# Patient Record
Sex: FEMALE | Race: BLACK OR AFRICAN AMERICAN | NOT HISPANIC OR LATINO | Employment: OTHER | URBAN - METROPOLITAN AREA
[De-identification: names, ages, dates, MRNs, and addresses within clinical notes are randomized per-mention and may not be internally consistent; named-entity substitution may affect disease eponyms.]

---

## 2017-01-09 ENCOUNTER — HOSPITAL ENCOUNTER (EMERGENCY)
Facility: OTHER | Age: 62
Discharge: HOME | End: 2017-01-09
Attending: EMERGENCY MEDICINE
Payer: COMMERCIAL

## 2017-01-09 VITALS
HEART RATE: 80 BPM | DIASTOLIC BLOOD PRESSURE: 70 MMHG | SYSTOLIC BLOOD PRESSURE: 137 MMHG | TEMPERATURE: 98 F | BODY MASS INDEX: 26.99 KG/M2 | OXYGEN SATURATION: 99 % | HEIGHT: 65 IN | RESPIRATION RATE: 16 BRPM | WEIGHT: 162 LBS

## 2017-01-09 DIAGNOSIS — R51.9 ACUTE NONINTRACTABLE HEADACHE, UNSPECIFIED HEADACHE TYPE: Primary | ICD-10-CM

## 2017-01-09 PROCEDURE — 96361 HYDRATE IV INFUSION ADD-ON: CPT

## 2017-01-09 PROCEDURE — 96374 THER/PROPH/DIAG INJ IV PUSH: CPT

## 2017-01-09 PROCEDURE — 96375 TX/PRO/DX INJ NEW DRUG ADDON: CPT

## 2017-01-09 PROCEDURE — 25000003 PHARM REV CODE 250: Performed by: PHYSICIAN ASSISTANT

## 2017-01-09 PROCEDURE — 63600175 PHARM REV CODE 636 W HCPCS: Performed by: PHYSICIAN ASSISTANT

## 2017-01-09 PROCEDURE — 99284 EMERGENCY DEPT VISIT MOD MDM: CPT | Mod: 25

## 2017-01-09 RX ORDER — ASPIRIN 81 MG/1
81 TABLET ORAL DAILY
COMMUNITY

## 2017-01-09 RX ORDER — KETOROLAC TROMETHAMINE 30 MG/ML
15 INJECTION, SOLUTION INTRAMUSCULAR; INTRAVENOUS
Status: COMPLETED | OUTPATIENT
Start: 2017-01-09 | End: 2017-01-09

## 2017-01-09 RX ORDER — METOCLOPRAMIDE HYDROCHLORIDE 5 MG/ML
10 INJECTION INTRAMUSCULAR; INTRAVENOUS
Status: COMPLETED | OUTPATIENT
Start: 2017-01-09 | End: 2017-01-09

## 2017-01-09 RX ORDER — DIPHENHYDRAMINE HYDROCHLORIDE 50 MG/ML
12.5 INJECTION INTRAMUSCULAR; INTRAVENOUS
Status: COMPLETED | OUTPATIENT
Start: 2017-01-09 | End: 2017-01-09

## 2017-01-09 RX ADMIN — SODIUM CHLORIDE 1000 ML: 0.9 INJECTION, SOLUTION INTRAVENOUS at 08:01

## 2017-01-09 RX ADMIN — DIPHENHYDRAMINE HYDROCHLORIDE 12.5 MG: 50 INJECTION, SOLUTION INTRAMUSCULAR; INTRAVENOUS at 08:01

## 2017-01-09 RX ADMIN — KETOROLAC TROMETHAMINE 15 MG: 30 INJECTION, SOLUTION INTRAMUSCULAR at 08:01

## 2017-01-09 RX ADMIN — METOCLOPRAMIDE 10 MG: 5 INJECTION, SOLUTION INTRAMUSCULAR; INTRAVENOUS at 08:01

## 2017-01-09 NOTE — ED AVS SNAPSHOT
[facility]  [address] [facility] [address]               Geneva Wright   2017  7:52 PM   ED    Description:  Female : 1955   Department:  [facility]           Your Care was Coordinated By:     Provider Role From To    Tiffanie Dunn MD Attending Provider 17 --    Heide Holley PA-C Physician Assistant 17 --      Reason for Visit     Headache           Diagnoses this Visit        Comments    Acute nonintractable headache, unspecified headache type    -  Primary       ED Disposition     None           To Do List           Follow-up Information     Follow up with Haley Washington MD In 2 days.    Specialty:  Internal Medicine    Contact information:    [address] [address]  [phone]          Follow up with [facility].    Specialty:  Emergency Medicine    Why:  If symptoms worsen    Contact information:    [address] [facility] [address] [phone]      [facility]     [facility] - 24/7 Assistance  Registered nurses in the [facility] provide clinical advisement, health education, appointment booking, and other advisory services.  Call for this free service at [phone].             Medications           Message regarding Medications     Verify the changes and/or additions to your medication regime listed below are the same as discussed with your clinician today.  If any of these changes or additions are incorrect, please notify your healthcare provider.        These medications were administered today        Dose Freq    sodium chloride 0.9% bolus 1,000 mL 1,000 mL ED 1 Time    Sig: Inject 1,000 mLs into the vein ED 1 Time.    Class: Normal    Route: Intravenous    metoclopramide HCl injection 10 mg 10 mg ED 1 Time    Sig: Inject 2 mLs (10 mg total) into the vein ED 1 Time.    Class: Normal    Route:  "Intravenous    diphenhydrAMINE injection 12.5 mg 12.5 mg ED 1 Time    Sig: Inject 0.25 mLs (12.5 mg total) into the vein ED 1 Time.    Class: Normal    Route: Intravenous    ketorolac injection 15 mg 15 mg ED 1 Time    Sig: Inject 15 mg into the vein ED 1 Time.    Class: Normal    Route: Intravenous      STOP taking these medications     atorvastatin (LIPITOR) 20 MG tablet Take 20 mg by mouth once daily.    doxycycline (VIBRA-TABS) 100 MG tablet Take 100 mg by mouth 2 (two) times daily.    pantoprazole (PROTONIX) 40 MG tablet Take 40 mg by mouth once daily.    tramadol (ULTRAM) 50 mg tablet Take 50 mg by mouth every 6 (six) hours as needed for Pain.           Verify that the below list of medications is an accurate representation of the medications you are currently taking.  If none reported, the list may be blank. If incorrect, please contact your healthcare provider. Carry this list with you in case of emergency.           Current Medications     amlodipine (NORVASC) 5 MG tablet Take 5 mg by mouth once daily.    aspirin (ECOTRIN) 81 MG EC tablet Take 81 mg by mouth once daily.    glimepiride (AMARYL) 1 MG tablet Take 1 mg by mouth before breakfast.    losartan (COZAAR) 100 MG tablet Take 100 mg by mouth once daily.    metformin (GLUCOPHAGE) 1000 MG tablet Take 1,000 mg by mouth 2 (two) times daily with meals.           Clinical Reference Information           Your Vitals Were     BP Pulse Temp Resp Height Weight    191/99 (BP Location: Left arm, Patient Position: Sitting) 99 97.9 °F (36.6 °C) (Oral) 16 5' 4.5" (1.638 m) 73.5 kg (162 lb)    SpO2 BMI             99% 27.38 kg/m2         Allergies as of 1/9/2017     No Known Allergies      Immunizations Administered on Date of Encounter - 1/9/2017     None      ED Micro, Lab, POCT     None      ED Imaging Orders     Start Ordered       Status Ordering Provider    01/09/17 2124 01/09/17 2124  CT Head Without Contrast  1 time imaging      Final result       Discharge " References/Attachments     HEADACHE, UNSPECIFIED (ENGLISH)    HEADACHES, SELF-CARE FOR (ENGLISH)      [facility] Sign-Up     Activating your [facility] account is as easy as 1-2-3!     1) Visit [email or web address], select Sign Up Now, enter this activation code and your date of birth, then select Next.  Activation code not generated  Current Patient Portal Status: Account disabled      2) Create a username and password to use when you visit [facility] in the future and select a security question in case you lose your password and select Next.    3) Enter your e-mail address and click Sign Up!    Additional Information  If you have questions, please e-mail [email or web address] or call [phone] to talk to our [email or web address] staff. Remember, [facility] is NOT to be used for urgent needs. For medical emergencies, dial 911.          [facility] complies with applicable Federal civil rights laws and does not discriminate on the basis of race, color, national origin, age, disability, or sex.        Language Assistance Services     ATTENTION: Language assistance services are available, free of charge. Please call [phone].      ATENCIÓN: Si habla español, tiene a hyatt disposición servicios gratuitos de asistencia lingüística. Llame al [phone].     CHÚ Ý: N?u b?n nói Ti?ng Vi?t, có các d?ch v? h? tr? ngôn ng? mi?n phí dành cho b?n. G?i s? [phone].

## 2017-01-10 NOTE — ED PROVIDER NOTES
Encounter Date: 1/9/2017       History     Chief Complaint   Patient presents with    Headache     with nausea for about 3 hours     Review of patient's allergies indicates:  No Known Allergies  HPI Comments: Patient is a 61 y.o. female with a past medical history of HTN, HLD, DM, CLL, DVT, CVA, presenting to the emergency department with complaints of a headache.  The patient reports that she has had a right-sided headache for the past 3 hours.  She reports that it is now sharp, stabbing and throbbing.  She rates the pain at an 8/10.  She does report associated nausea however denies vomiting, blurred vision, recent head trauma or injury.  She denies neck pain or stiffness.  She admits she is taken Tylenol with no relief her symptoms.  She denies previous episode.  She also states that she took some [place] sinus for his symptoms with no relief.    The history is provided by the patient.     Past Medical History   Diagnosis Date    CLL (chronic lymphocytic leukemia)     Diabetes mellitus     DVT (deep venous thrombosis)     Hyperlipidemia     Hypertension      No past medical history pertinent negatives.  Past Surgical History   Procedure Laterality Date    Hysterectomy      Cholecystectomy      Eye surgery      Partial liver resection      Transduodenal resection of polyp       Family History   Problem Relation Age of Onset    Hypertension Mother     Diabetes Mother     Diabetes Father      Social History   Substance Use Topics    Smoking status: Never Smoker    Smokeless tobacco: None    Alcohol use No     Review of Systems   Constitutional: Negative for activity change, appetite change, chills, fatigue and fever.   HENT: Negative for congestion, rhinorrhea, sinus pressure, sneezing, sore throat and trouble swallowing.    Eyes: Negative for photophobia and visual disturbance.   Respiratory: Negative for cough, chest tightness, shortness of breath and wheezing.    Cardiovascular: Negative for  chest pain and palpitations.   Gastrointestinal: Positive for nausea. Negative for abdominal pain, constipation, diarrhea and vomiting.   Genitourinary: Negative for dysuria, hematuria and urgency.   Musculoskeletal: Negative for back pain, myalgias, neck pain and neck stiffness.   Skin: Negative for color change and wound.   Neurological: Positive for headaches. Negative for dizziness, weakness, light-headedness and numbness.   Psychiatric/Behavioral: Negative for agitation and confusion.       Physical Exam   Initial Vitals   BP Pulse Resp Temp SpO2   01/09/17 1904 01/09/17 1904 01/09/17 1904 01/09/17 1904 01/09/17 1904   191/99 99 16 97.9 °F (36.6 °C) 99 %     Physical Exam    Nursing note and vitals reviewed.  Constitutional: She appears well-developed and well-nourished. She is not diaphoretic. She is cooperative.  Non-toxic appearance. She does not have a sickly appearance. She does not appear ill. No distress.   Well appearing, -American female unaccompanied in the emergency department.  She is speaking in clear and full sentences, moving all extremities, ambulates that difficulty.   HENT:   Head: Normocephalic and atraumatic.   Right Ear: Hearing, tympanic membrane, external ear and ear canal normal.   Left Ear: Hearing, tympanic membrane, external ear and ear canal normal.   Nose: Nose normal.   Mouth/Throat: Oropharynx is clear and moist.   Eyes: Conjunctivae, EOM and lids are normal. Pupils are equal, round, and reactive to light.   Neck: Normal range of motion. Neck supple.   Cardiovascular: Normal rate, regular rhythm and normal heart sounds.   Pulmonary/Chest: Breath sounds normal. No respiratory distress. She has no wheezes. She has no rhonchi. She has no rales.   Abdominal: Soft. Bowel sounds are normal. She exhibits no distension. There is no tenderness. There is no rebound and no guarding.   Musculoskeletal: Normal range of motion.   Neurological: She is alert and oriented to person, place,  and time. She has normal strength. No cranial nerve deficit or sensory deficit. GCS eye subscore is 4. GCS verbal subscore is 5. GCS motor subscore is 6.   AAOx4. CN II-XII were intact. Ambulatory with normal gait.   Skin: Skin is warm.   Psychiatric: She has a normal mood and affect. Her behavior is normal. Judgment and thought content normal.         ED Course   Procedures  Labs Reviewed - No data to display     Imaging Results         CT Head Without Contrast (Final result) Result time:  01/09/17 21:50:35    Final result by Jeffrey Villegas MD (01/09/17 21:50:35)    Impression:        No acute intracranial abnormalities.    Remote RIGHT posterior temporal infarct with some compensatory enlargement of the RIGHT ventricular trigone.     LEFT ethmoid sinus disease.      Electronically signed by: JEFFREY VILLEGAS MD  Date:     01/09/17  Time:    21:50     Narrative:    History: headache    Comparison: None    Technique:    Axial images of the brain were obtained at 5-mm intervals from the skull base to the vertex without the administration of contrast.    Findings:    Remote RIGHT posterior temporal infarct with some compensatory enlargement of the RIGHT ventricular trigone. There is no evidence of acute infarct, hemorrhage, or mass.  The ventricular system is normal in size.  No mass-effect or midline shift.  There are no abnormal extra-axial fluid collections.      Opacified LEFT ethmoid air cell complex. The remaining visualized paranasal sinuses and mastoid air cells are clear.      The calvarium appears intact.  .                Medical Decision Making:   Clinical Tests:   Radiological Study: Ordered and Reviewed  Other:   I have discussed this case with another health care provider.       <> Summary of the Discussion: Dr. Dunn  This note was created using Dragon Medical Dictation. There may be typographical errors secondary to dictation.     Urgent evaluation of a 61 y.o. female with a past medical history of  HTN, HLD, DM, CLL, DVT, CVA, presenting to the emergency department complaining of headache with nausea. Patient is afebrile, nontoxic appearing and hemodynamically stable.  Physical exam reveals regular rate and rhythm, lungs are clear to auscultation bilaterally. There are no focal weakness, numbness, meningismus, or other focal neurologic deficit. There is no history of trauma, fevers, elevated blood pressure to suggest meningitis, subarachnoid hemorrhage, TIA, stroke, mass, or hypertensive urgency. Will administer IV medications, obtain head CT and reassess.  CT of the head shows no acute abnormalities; there are evidence of prior infarcts consistent with the patient's history of a CVA in 2005.  On reassessment, the patient reports much relief her symptoms and states she is ready to go home.  At this time, I do agree and feel comfortable with discharge.  She was advised on symptomatic care and treatment. The patient was instructed to follow up with a primary care provider in 2 days or to return to the emergency department for worsening symptoms. The treatment plan was discussed with the patient who demonstrated understanding and comfort with plan. The patient's history, physical exam, and plan of care was discussed with and agreed upon with my supervising physician.     Past Medical History   Diagnosis Date    CLL (chronic lymphocytic leukemia)     Diabetes mellitus     DVT (deep venous thrombosis)     Hyperlipidemia     Hypertension                      ED Course     Clinical Impression:     1. Acute nonintractable headache, unspecified headache type         Disposition:   Disposition: Discharged  Condition: Stable       Heide Holley PA-C  01/09/17 [id number]

## 2017-01-10 NOTE — ED NOTES
62 y/o AAF to ED with headache to R side and frontal described as sinus pain x 1 day. Pt reports ringing of R ear at times.

## 2022-07-05 ENCOUNTER — HOSPITAL ENCOUNTER (OUTPATIENT)
Dept: RADIOLOGY | Facility: HOSPITAL | Age: 67
Discharge: HOME | End: 2022-07-05
Attending: FAMILY MEDICINE
Payer: MEDICARE

## 2022-07-05 DIAGNOSIS — B34.8 PARAINFLUENZA VIRUS INFECTION: ICD-10-CM

## 2022-07-05 PROCEDURE — 71046 XR CHEST PA AND LATERAL: ICD-10-PCS | Mod: 26,,, | Performed by: RADIOLOGY

## 2022-07-05 PROCEDURE — 71046 X-RAY EXAM CHEST 2 VIEWS: CPT | Mod: 26,,, | Performed by: RADIOLOGY

## 2022-07-05 PROCEDURE — 71046 X-RAY EXAM CHEST 2 VIEWS: CPT | Mod: TC,FY

## 2024-04-02 ENCOUNTER — HOSPITAL ENCOUNTER (EMERGENCY)
Facility: OTHER | Age: 69
Discharge: HOME | End: 2024-04-03
Attending: INTERNAL MEDICINE
Payer: MEDICARE

## 2024-04-02 DIAGNOSIS — R20.2 PARESTHESIAS: Primary | ICD-10-CM

## 2024-04-02 DIAGNOSIS — R41.0 CONFUSION: ICD-10-CM

## 2024-04-02 LAB
ALBUMIN SERPL BCP-MCNC: 4.5 G/DL (ref 3.5–5.2)
ALP SERPL-CCNC: 146 U/L (ref 55–135)
ALT SERPL W/O P-5'-P-CCNC: 16 U/L (ref 10–44)
AMPHET+METHAMPHET UR QL: NEGATIVE
ANION GAP SERPL CALC-SCNC: 14 MMOL/L (ref 8–16)
AST SERPL-CCNC: 16 U/L (ref 10–40)
BACTERIA #/AREA URNS HPF: NORMAL /HPF
BARBITURATES UR QL SCN>200 NG/ML: NEGATIVE
BASOPHILS # BLD AUTO: 0.04 K/UL (ref 0–0.2)
BASOPHILS NFR BLD: 0.3 % (ref 0–1.9)
BENZODIAZ UR QL SCN>200 NG/ML: NEGATIVE
BILIRUB SERPL-MCNC: 0.8 MG/DL (ref 0.1–1)
BILIRUB UR QL STRIP: NEGATIVE
BUN SERPL-MCNC: 13 MG/DL (ref 8–23)
BZE UR QL SCN: NEGATIVE
CALCIUM SERPL-MCNC: 9.4 MG/DL (ref 8.7–10.5)
CANNABINOIDS UR QL SCN: NEGATIVE
CHLORIDE SERPL-SCNC: 107 MMOL/L (ref 95–110)
CLARITY UR: CLEAR
CO2 SERPL-SCNC: 24 MMOL/L (ref 23–29)
COLOR UR: YELLOW
CREAT SERPL-MCNC: 0.8 MG/DL (ref 0.5–1.4)
CREAT UR-MCNC: 25.2 MG/DL (ref 15–325)
DIFFERENTIAL METHOD BLD: ABNORMAL
EOSINOPHIL # BLD AUTO: 0 K/UL (ref 0–0.5)
EOSINOPHIL NFR BLD: 0.3 % (ref 0–8)
ERYTHROCYTE [DISTWIDTH] IN BLOOD BY AUTOMATED COUNT: 13 % (ref 11.5–14.5)
EST. GFR  (NO RACE VARIABLE): >60 ML/MIN/1.73 M^2
ETHANOL UR-MCNC: <10 MG/DL
GLUCOSE SERPL-MCNC: 169 MG/DL (ref 70–110)
GLUCOSE UR QL STRIP: ABNORMAL
HCT VFR BLD AUTO: 49 % (ref 37–48.5)
HCV AB SERPL QL IA: NEGATIVE
HGB BLD-MCNC: 16.1 G/DL (ref 12–16)
HGB UR QL STRIP: NEGATIVE
HIV 1+2 AB+HIV1 P24 AG SERPL QL IA: NEGATIVE
IMM GRANULOCYTES # BLD AUTO: 0.13 K/UL (ref 0–0.04)
IMM GRANULOCYTES NFR BLD AUTO: 0.9 % (ref 0–0.5)
KETONES UR QL STRIP: ABNORMAL
LEUKOCYTE ESTERASE UR QL STRIP: NEGATIVE
LYMPHOCYTES # BLD AUTO: 2 K/UL (ref 1–4.8)
LYMPHOCYTES NFR BLD: 13.2 % (ref 18–48)
MAGNESIUM SERPL-MCNC: 1.9 MG/DL (ref 1.6–2.6)
MCH RBC QN AUTO: 29.3 PG (ref 27–31)
MCHC RBC AUTO-ENTMCNC: 32.9 G/DL (ref 32–36)
MCV RBC AUTO: 89 FL (ref 82–98)
METHADONE UR QL SCN>300 NG/ML: NEGATIVE
MICROSCOPIC COMMENT: NORMAL
MONOCYTES # BLD AUTO: 1 K/UL (ref 0.3–1)
MONOCYTES NFR BLD: 6.9 % (ref 4–15)
NEUTROPHILS # BLD AUTO: 11.7 K/UL (ref 1.8–7.7)
NEUTROPHILS NFR BLD: 78.4 % (ref 38–73)
NITRITE UR QL STRIP: NEGATIVE
NRBC BLD-RTO: 0 /100 WBC
OPIATES UR QL SCN: NEGATIVE
PCP UR QL SCN>25 NG/ML: NEGATIVE
PH UR STRIP: 8 [PH] (ref 5–8)
PHOSPHATE SERPL-MCNC: 2.3 MG/DL (ref 2.7–4.5)
PLATELET # BLD AUTO: 194 K/UL (ref 150–450)
PMV BLD AUTO: 8.9 FL (ref 9.2–12.9)
POCT GLUCOSE: 178 MG/DL (ref 70–110)
POTASSIUM SERPL-SCNC: 3.3 MMOL/L (ref 3.5–5.1)
PROT SERPL-MCNC: 7.4 G/DL (ref 6–8.4)
PROT UR QL STRIP: NEGATIVE
RBC # BLD AUTO: 5.5 M/UL (ref 4–5.4)
RBC #/AREA URNS HPF: 0 /HPF (ref 0–4)
SODIUM SERPL-SCNC: 145 MMOL/L (ref 136–145)
SP GR UR STRIP: 1.01 (ref 1–1.03)
SQUAMOUS #/AREA URNS HPF: 2 /HPF
TOXICOLOGY INFORMATION: NORMAL
URN SPEC COLLECT METH UR: ABNORMAL
UROBILINOGEN UR STRIP-ACNC: NEGATIVE EU/DL
WBC # BLD AUTO: 14.85 K/UL (ref 3.9–12.7)
WBC #/AREA URNS HPF: 2 /HPF (ref 0–5)
YEAST URNS QL MICRO: NORMAL

## 2024-04-02 PROCEDURE — 87389 HIV-1 AG W/HIV-1&-2 AB AG IA: CPT | Performed by: INTERNAL MEDICINE

## 2024-04-02 PROCEDURE — 80307 DRUG TEST PRSMV CHEM ANLYZR: CPT | Performed by: INTERNAL MEDICINE

## 2024-04-02 PROCEDURE — 99285 EMERGENCY DEPT VISIT HI MDM: CPT | Mod: 25

## 2024-04-02 PROCEDURE — 85025 COMPLETE CBC W/AUTO DIFF WBC: CPT | Performed by: INTERNAL MEDICINE

## 2024-04-02 PROCEDURE — 84100 ASSAY OF PHOSPHORUS: CPT | Performed by: INTERNAL MEDICINE

## 2024-04-02 PROCEDURE — 83735 ASSAY OF MAGNESIUM: CPT | Performed by: INTERNAL MEDICINE

## 2024-04-02 PROCEDURE — 86803 HEPATITIS C AB TEST: CPT | Performed by: INTERNAL MEDICINE

## 2024-04-02 PROCEDURE — 82962 GLUCOSE BLOOD TEST: CPT

## 2024-04-02 PROCEDURE — 80053 COMPREHEN METABOLIC PANEL: CPT | Performed by: INTERNAL MEDICINE

## 2024-04-02 PROCEDURE — 81000 URINALYSIS NONAUTO W/SCOPE: CPT | Performed by: INTERNAL MEDICINE

## 2024-04-03 VITALS
OXYGEN SATURATION: 97 % | HEART RATE: 70 BPM | DIASTOLIC BLOOD PRESSURE: 70 MMHG | TEMPERATURE: 98 F | SYSTOLIC BLOOD PRESSURE: 152 MMHG | RESPIRATION RATE: 18 BRPM

## 2024-04-03 PROCEDURE — 25000003 PHARM REV CODE 250: Performed by: INTERNAL MEDICINE

## 2024-04-03 RX ADMIN — DIBASIC SODIUM PHOSPHATE, MONOBASIC POTASSIUM PHOSPHATE AND MONOBASIC SODIUM PHOSPHATE 1 TABLET: 852; 155; 130 TABLET ORAL at 12:04

## 2024-04-03 NOTE — ED NOTES
Pt ambulatory with  an independent, steady gait, MD Delfino aware that pt remains confused to time, but MD and pt feel safe discharging pt with her daughter, who is here now to pick her up.

## 2024-04-03 NOTE — ED PROVIDER NOTES
Encounter date: 10:25 PM 04/03/2024    Source of History   Patient/EMS       Chief Complaint   Pt presents with:   Numbness (Pt presents via no ems with c/o bilateral hand numbness x45 min.)    History Of Present Illness   Geneva Wright is a 68 y.o. female with history of hypertension, diabetes, CLL who presents to the ED via EMS with finger and facial numbness onset 45 minutes PTA. EMS reports that the patient called secondary to mouth numbness, which resolved on EMS arrival. EMS states that the patient's finger numbness progressively began to resolve en route. According to EMS, patient was A&O x 4 at home, but since ED arrival has become confused, does not recall calling 911, and is not sure why she is in the ED. She was able to state her first name but did not respond when asked to state what year it is. The patient denied headache, chest pain, abdominal pain, SOB, nausea, vomiting, diarrhea, dysuria, confusion, and any recent falls or trauma. Patient states that she is currently receiving treatment for her Leukemia. Patient adds that she lives at home alone.  Her daughter who presents the ED later provides history stating that the patient has recently finished treatment for CLL.  She notes that her mom called because she felt that her fingers and legs felt tight.  She denies head trauma.  She states the patient never reported mouth tingling.  On my exam the patient denies having mouth tingling.  She notes that she has an upcoming appointment with her hematologist    This is the extent to the patients complaints today here in the emergency department.  Past History   Review of patient's allergies indicates:  No Known Allergies    No current facility-administered medications on file prior to encounter.     Current Outpatient Medications on File Prior to Encounter   Medication Sig Dispense Refill    amlodipine (NORVASC) 5 MG tablet Take 5 mg by mouth once daily.      aspirin (ECOTRIN) 81 MG EC tablet Take 81  mg by mouth once daily.      glimepiride (AMARYL) 1 MG tablet Take 1 mg by mouth before breakfast.      losartan (COZAAR) 100 MG tablet Take 100 mg by mouth once daily.      metformin (GLUCOPHAGE) 1000 MG tablet Take 1,000 mg by mouth 2 (two) times daily with meals.         As per HPI and below:  Past Medical History:   Diagnosis Date    CLL (chronic lymphocytic leukemia)     Diabetes mellitus     DVT (deep venous thrombosis)     Hyperlipidemia     Hypertension      Past Surgical History:   Procedure Laterality Date    CHOLECYSTECTOMY      EYE SURGERY      HYSTERECTOMY      partial liver resection      transduodenal resection of polyp         Social History     Socioeconomic History    Marital status:    Tobacco Use    Smoking status: Never   Substance and Sexual Activity    Alcohol use: No    Drug use: No       Family History   Problem Relation Age of Onset    Hypertension Mother     Diabetes Mother     Diabetes Father        Physical Exam     Vitals:    04/02/24 2224 04/02/24 2302 04/03/24 0025   BP: (!) 168/76 (!) 176/77 (!) 152/70   BP Location:  Right arm    Patient Position:  Lying    Pulse: 81 83 70   Resp: 18 18 18   Temp: 97.2 °F (36.2 °C) 97.7 °F (36.5 °C)    TempSrc:  Oral    SpO2: 98% 95% 97%     Physical Exam:   Nursing note and vitals reviewed.  Appearance: Well-appearing, nontoxic female in no acute respiratory distress.  Making purposeful movements.  Speaking in full sentences.  Skin: No obvious rashes seen.  Good turgor.  No abrasions.  No ecchymoses.  Eyes: No conjunctival injection. EOMI, PERRL.  Head: NC/AT  Chest: CTAB. No increased work of breathing, bilateral chest rise.  Cardiovascular: Regular rate and rhythm.  Normal equal bilateral radial and DP pulses.  Abdomen: Soft.  Not distended.  Nontender.  No guarding.  No rebound. No Masses  Musculoskeletal: No obvious deformities.   Neck supple, full range of motion, no obvious deformity.   No tenderness to palpation of cervical through  lumbar spine.  No step-offs or deformities. Good range of motion all joints.  Neurologic: Moves all extremities.  Normal sensation.  No facial droop.  Normal speech. Moves all extremities and carries on conversation. CN- II: PERRL; III/IV/VI: EOMI w/out evidence of nystagmus; V: no deficits appreciated to light touch bilateral face; VII: no facial weakness, no facial asymmetry. Eyebrow raise symmetric. Smile symmetric; IX/X: palate midline, and raises symmetrically; XI: shoulder shrug 5/5 bilaterally; XII: tongue is midline w/out asymmetry. Strength 5/5 to bilateral upper and lower extremities, sensation intact to light touch.  Gait stable  Mental Status:  Alert and oriented x 3 person, place, location.  Appropriate, conversant.      Results and Medications    Procedures    Labs Reviewed   CBC W/ AUTO DIFFERENTIAL - Abnormal; Notable for the following components:       Result Value    WBC 14.85 (*)     RBC 5.50 (*)     Hemoglobin 16.1 (*)     Hematocrit 49.0 (*)     MPV 8.9 (*)     Immature Granulocytes 0.9 (*)     Gran # (ANC) 11.7 (*)     Immature Grans (Abs) 0.13 (*)     Gran % 78.4 (*)     Lymph % 13.2 (*)     All other components within normal limits   COMPREHENSIVE METABOLIC PANEL - Abnormal; Notable for the following components:    Potassium 3.3 (*)     Glucose 169 (*)     Alkaline Phosphatase 146 (*)     All other components within normal limits   URINALYSIS, REFLEX TO URINE CULTURE - Abnormal; Notable for the following components:    Glucose, UA 3+ (*)     Ketones, UA 1+ (*)     All other components within normal limits    Narrative:     Specimen Source->Urine   PHOSPHORUS - Abnormal; Notable for the following components:    Phosphorus 2.3 (*)     All other components within normal limits   POCT GLUCOSE - Abnormal; Notable for the following components:    POCT Glucose 178 (*)     All other components within normal limits   MAGNESIUM   TOXICOLOGY SCREEN, URINE, RANDOM (COMPLIANCE)    Narrative:     Specimen  Source->Urine   HIV 1 / 2 ANTIBODY    Narrative:     Release to patient->Immediate   HEPATITIS C ANTIBODY    Narrative:     Release to patient->Immediate   URINALYSIS MICROSCOPIC    Narrative:     Specimen Source->Urine   POCT GLUCOSE MONITORING CONTINUOUS       Imaging Results              X-Ray Chest AP Portable (Final result)  Result time 04/02/24 22:46:22      Final result by Jeffrey Villegas MD (04/02/24 22:46:22)                   Impression:      No acute findings in the chest.      Electronically signed by: Jeffrey Villegas MD  Date:    04/02/2024  Time:    22:46               Narrative:    EXAMINATION:  XR CHEST AP PORTABLE    CLINICAL HISTORY:  Sepsis;    TECHNIQUE:  Single frontal view of the chest was performed.    COMPARISON:  07/05/2022.    FINDINGS:  No consolidation, pleural effusion or pneumothorax.    Cardiomediastinal silhouette is unremarkable.                                       CT Head Without Contrast (Final result)  Result time 04/02/24 22:47:26      Final result by Amara Coates MD (04/02/24 22:47:26)                   Impression:      No acute intracranial abnormalities identified.  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  No significant detrimental change compared to previous CT head from January 2017.      Electronically signed by: Amara Coates MD  Date:    04/02/2024  Time:    22:47               Narrative:    EXAMINATION:  CT HEAD WITHOUT CONTRAST    CLINICAL HISTORY:  Mental status change, unknown cause;    TECHNIQUE:  Low dose axial images were obtained through the head.  Coronal and sagittal reformations were also performed. Contrast was not administered.    COMPARISON:  CT head from January 2017.    FINDINGS:  Moderate-size region of remote infarction with encephalomalacia is seen involving the right posterior temporal lobe.  No evidence of acute/recent major vascular distribution cerebral infarction,  intraparenchymal hemorrhage, or intra-axial space occupying lesion. The ventricular system is normal in size and configuration with no evidence of hydrocephalus. No effacement of the skull-base cisterns.  Empty sella configuration is noted.  No abnormal extra-axial fluid collections or blood products. Visualized paranasal sinuses and mastoid air cells are clear. The calvarium shows no significant abnormality.                                          Medications   k phos di & mono-sod phos mono 250 mg tablet 1 tablet (1 tablet Oral Given 4/3/24 0003)       MDM, Impression and Plan   Previous Records:   I decided to obtain old medical records which is listed here or in ED course:  History of CLL who follows with hematology with follow up on 04/09/2024    Independently Interpreted Test(s):   IMAGING:  I have ordered and independently interpreted X-rays and my findings are as follow:  Detailed here or in ED course. CXR shows no pneumothorax, pneumonia or pleural effusions.    Clinical Tests:   Lab Tests: Ordered and Reviewed  Radiological Study: Ordered and Reviewed  Medical Tests: Ordered and Reviewed    Differential diagnosis:  -unlikely CVA without focal neurologic deficits, electrolyte abnormalities  -unlikely ICH based off of imaging   -UTI   -dementia   -thought was given to ACS yet she denies any chest pain or shortness of breath  -Hypoglycemia    Initial Assessment & ED Management:    Urgent evaluation of 68 y.o. female with history of hypertension, diabetes, CLL who presents to the ED via EMS with complaint of finger and facial numbness onset 45 minutes prior to arrival.  It does appear after talking with the daughter and the patient again the patient noted tightness in her hands and lower extremities.  When asked about facial numbness she denied this.  I was called by nursing staff to urgently evaluate the patient for potential initiation of code stroke.  She has no focal neurologic deficits on my exam.  She  does however seem to be confused about the month.  The daughter states that this is unusual for her.  Her vitals remained stable in the ED. she was noted to have a leukocytosis but this is downtrending from her values and she was noted to have history of CLL in remission urine tox and urinalysis not consistent with UTI or intoxication.  CT head with no acute intracranial bleed.  Chest x-ray with no pneumonia.  Patient was reassessed and has no ongoing paresthesias.  Her repeat neurologic exam is grossly unremarkable.  She was able to tell me the month.  Had shared decision-making with patient and daughter they feel comfortable going home and following up with the PCP in the next 1-2 days and keeping their appointment with the hematologist.  Patient's gait was stable.  She p.o. challenged successfully.  She has no ongoing complaints.  I did warn them that this may be new onset dementia.    Care plan addressed with patient and all those present. All questions answered.  Strict return precautions discussed.  Patient was instructed on the correct follow-up time and route.  They voiced verbal understanding and agreement  with the plan and were deemed stable for discharge.      Medical Decision Making  See HPI for EMS report.    Amount and/or Complexity of Data Reviewed  Labs: ordered.  Radiology: ordered.    Risk  OTC drugs.             Please see ED course for discussion of workup and dispo if not listed above.          Final diagnoses:  [R20.2] Paresthesias (Primary)  [R41.0] Confusion        ED Disposition Condition    Discharge Stable          ED Prescriptions    None       Follow-up Information       Follow up With Specialties Details Why Contact Info    Julianne Maravilla MD Family Medicine In 2 days  [address]  [address]  [place] C[place] [facility] [address]  [phone]      [facility] Emergency Dept Emergency Medicine  If symptoms worsen [address]  [address]  [phone]            ED Course as of 04/03/24 0107 Wed Apr 03, 2024   Carmen Junior [phone]. Had discussion with patient's daughter (Vernon) and was told that the patient called her while having a bowel movement and complained of her fingers and legs feeling tight. Daughter states that the patient's neighbor went to go check on her and EMS was called. Patient is returning to baseline, but is still slightly confused. Patient and daughter are both comfortable with being discharged home, coming back if symptoms worsen, and following up with hematology. [AB]      ED Course User Index  [AB] Kelly Rudd Alyssa Hillger, scribed for, and in the presence of, Isamar Saleh MD. I performed the scribed service and the documentation accurately describes the services I performed. I attest to the accuracy of the note.     TRICIA, Kelly Rudd, scribed for, and in the presence of, Isamar Saleh MD. I performed the scribed service and the documentation accurately describes the services I performed. I attest to the accuracy of the note.     Physician Attestation for Scribe: I, Isamar Saleh MD , reviewed documentation as scribed in my presence, which is both accurate and complete.    MD Delfino David Heyward B, MD  04/03/24 0108

## 2024-04-03 NOTE — DISCHARGE INSTRUCTIONS
Diagnosis:   1. Paresthesias        Tests you had showed:   Labs Reviewed   CBC W/ AUTO DIFFERENTIAL - Abnormal; Notable for the following components:       Result Value    WBC 14.85 (*)     RBC 5.50 (*)     Hemoglobin 16.1 (*)     Hematocrit 49.0 (*)     MPV 8.9 (*)     Immature Granulocytes 0.9 (*)     Gran # (ANC) 11.7 (*)     Immature Grans (Abs) 0.13 (*)     Gran % 78.4 (*)     Lymph % 13.2 (*)     All other components within normal limits   COMPREHENSIVE METABOLIC PANEL - Abnormal; Notable for the following components:    Potassium 3.3 (*)     Glucose 169 (*)     Alkaline Phosphatase 146 (*)     All other components within normal limits   URINALYSIS, REFLEX TO URINE CULTURE - Abnormal; Notable for the following components:    Glucose, UA 3+ (*)     Ketones, UA 1+ (*)     All other components within normal limits    Narrative:     Specimen Source->Urine   PHOSPHORUS - Abnormal; Notable for the following components:    Phosphorus 2.3 (*)     All other components within normal limits   POCT GLUCOSE - Abnormal; Notable for the following components:    POCT Glucose 178 (*)     All other components within normal limits   MAGNESIUM   TOXICOLOGY SCREEN, URINE, RANDOM (COMPLIANCE)    Narrative:     Specimen Source->Urine   HIV 1 / 2 ANTIBODY    Narrative:     Release to patient->Immediate   HEPATITIS C ANTIBODY    Narrative:     Release to patient->Immediate   URINALYSIS MICROSCOPIC    Narrative:     Specimen Source->Urine   POCT GLUCOSE MONITORING CONTINUOUS      X-Ray Chest AP Portable   Final Result      No acute findings in the chest.         Electronically signed by: Jeffrey Villegas MD   Date:    04/02/2024   Time:    22:46      CT Head Without Contrast   Final Result      No acute intracranial abnormalities identified.  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  No significant detrimental change compared to previous CT head from January 2017.          Electronically signed by: Amara Coates MD   Date:    04/02/2024   Time:    22:47          Treatments you received were:   Medications   k phos di & mono-sod phos mono 250 mg tablet 1 tablet (1 tablet Oral Given 4/3/24 0003)       Home Care Instructions:  - Medications: Continue taking your home medications as prescribed    Follow-Up Plan:  - Follow-up with: Primary care doctor within 1-2  days  - Additional testing and/or evaluation will be directed by your primary doctor    Return to the Emergency Department for symptoms including but not limited to: worsening symptoms, severe back pain, shortness of breath or chest pain, vomiting with inability to hold down fluids, blood in vomit or poop, fevers greater than 100.4°F, passing out/fainting/unconsciousness, or other concerning symptoms.     No future appointments.  Please keep your appointment with your hematologist on 04/09/2024    Your blood pressure was elevated in the emergency department.  You must follow-up with your primary care doctor for adjustment of your medicine.

## 2024-04-03 NOTE — ED TRIAGE NOTES
Per pt's daughter, pt called her this afternoon to tell her that she was having numbness on her face. The daugher than advised her to call 911. On arrival to the ED with EMS, pt denied any more numbness, but did not recall the details of when or where she was picked up by them. Pt is confused to time, but otherwise oriented. Denies pmh besides leukemia in remission. Pt in NAD, respirations unlabored, denies any complaints at this time, denies numbness, facial movement symmetrical,  strength equal bilaterally, denies CP/SOB/bowel or bladder issues.

## 2025-03-26 ENCOUNTER — TELEPHONE (OUTPATIENT)
Dept: HEMATOLOGY/ONCOLOGY | Facility: CLINIC | Age: 70
End: 2025-03-26
Payer: MEDICARE

## 2025-03-26 NOTE — TELEPHONE ENCOUNTER
I attempted to return a call back from Ms. Wright however I received her voicemail and a message stating that her message box is full. Home and mobile numbers both attempted.

## 2025-04-04 ENCOUNTER — TELEPHONE (OUTPATIENT)
Dept: HEMATOLOGY/ONCOLOGY | Facility: CLINIC | Age: 70
End: 2025-04-04
Payer: MEDICARE

## 2025-04-04 DIAGNOSIS — C91.10 CLL (CHRONIC LYMPHOCYTIC LEUKEMIA): Primary | ICD-10-CM

## 2025-04-04 DIAGNOSIS — D72.820 LYMPHOCYTOSIS: ICD-10-CM

## 2025-04-04 NOTE — TELEPHONE ENCOUNTER
I attempted to return a call to Ms. Wright to schedule an appointment for her. I attempted both phone numbers multiple times however the line did not ring and went straight to a message stating Ms. Wright does not have a VM box set up. I contacted Ms. Wright's alternate contact, her daughter Ms. Immanuel Heller.     I was able to speak with Ms. Heller who stated she will talk to her mother and ask her to give me a call. All questions welcomed and answered.     ----- Message from Markos sent at 3/26/2025  2:36 PM CDT -----  Regarding: Returning a Missed Call  Contact: [phone]  Returning a Missed Call Caller: Geneva Adriana  Returning call to:  Lopez Zapata Caller can be reached @:  [phone] Nature of the call: Returning call to schedule

## 2025-04-04 NOTE — TELEPHONE ENCOUNTER
Patient navigator Malcolm Chauhan was able to call Ms. Wright and transfer the call to me. She has been scheduled for a 6 m.o. FUV with Dr. Morales in July per her request. She will get her labs done at [facility]. The orders have been pended and sent to Nurse Nini Vanessa to sign. All questions welcomed and answered at this time.

## 2025-04-07 ENCOUNTER — TELEPHONE (OUTPATIENT)
Dept: HEMATOLOGY/ONCOLOGY | Facility: CLINIC | Age: 70
End: 2025-04-07
Payer: MEDICARE

## 2025-04-07 NOTE — TELEPHONE ENCOUNTER
Ms. Wright has been scheduled. No further action needed.     ----- Message from Markos sent at 4/4/2025 10:46 AM CDT -----  Regarding: Returning a Missed Call  Contact: [phone]  Regarding: Returning a Missed CallContact: [phone] a Missed Call Caller: Geneva Wright  Returning call to:  Lopez Zapata Caller can be reached @:    [phone] Nature of the call: Returning call to schedule

## 2025-06-19 DIAGNOSIS — C91.10 CLL (CHRONIC LYMPHOCYTIC LEUKEMIA): Primary | ICD-10-CM

## 2025-06-25 ENCOUNTER — TELEPHONE (OUTPATIENT)
Dept: HEMATOLOGY/ONCOLOGY | Facility: CLINIC | Age: 70
End: 2025-06-25
Payer: MEDICARE

## 2025-06-25 ENCOUNTER — PATIENT MESSAGE (OUTPATIENT)
Dept: HEMATOLOGY/ONCOLOGY | Facility: CLINIC | Age: 70
End: 2025-06-25
Payer: MEDICARE

## 2025-06-25 DIAGNOSIS — C91.10 CLL (CHRONIC LYMPHOCYTIC LEUKEMIA): Primary | ICD-10-CM

## 2025-06-25 DIAGNOSIS — D72.820 LYMPHOCYTOSIS: ICD-10-CM

## 2025-06-25 NOTE — TELEPHONE ENCOUNTER
Attempted to reach Ms. Wright x3 in regards to setting her up for a lab appointment prior to her visit with Dr. Morales on 7/2. No VM box set up.     Lab appointment made, portal message sent.

## 2025-06-27 RX ORDER — OMEPRAZOLE 20 MG/1
20 CAPSULE, DELAYED RELEASE ORAL EVERY MORNING
COMMUNITY

## 2025-06-27 RX ORDER — INSULIN GLARGINE 100 [IU]/ML
INJECTION, SOLUTION SUBCUTANEOUS
COMMUNITY
Start: 2025-05-07

## 2025-06-27 RX ORDER — POTASSIUM CHLORIDE 1500 MG/1
TABLET, EXTENDED RELEASE ORAL
COMMUNITY
End: 2025-07-02

## 2025-06-27 RX ORDER — CELECOXIB 100 MG/1
CAPSULE ORAL
COMMUNITY
End: 2025-07-02

## 2025-06-27 RX ORDER — BLOOD-GLUCOSE,RECEIVER,CONT
EACH MISCELLANEOUS
COMMUNITY
Start: 2025-04-24

## 2025-06-27 RX ORDER — CETIRIZINE HYDROCHLORIDE 10 MG/1
TABLET ORAL
COMMUNITY
End: 2025-07-02

## 2025-06-27 RX ORDER — ELECTROLYTES/DEXTROSE
1 SOLUTION, ORAL ORAL
COMMUNITY
End: 2025-07-02

## 2025-06-27 RX ORDER — TIRZEPATIDE 7.5 MG/.5ML
INJECTION, SOLUTION SUBCUTANEOUS
COMMUNITY
Start: 2025-06-06

## 2025-06-27 RX ORDER — DICYCLOMINE HYDROCHLORIDE 10 MG/1
10 CAPSULE ORAL 4 TIMES DAILY
COMMUNITY
Start: 2025-06-14 | End: 2025-07-02

## 2025-06-27 RX ORDER — ACETAMINOPHEN 500 MG
1 TABLET ORAL DAILY
COMMUNITY

## 2025-06-27 RX ORDER — LANCETS 30 GAUGE
EACH MISCELLANEOUS 2 TIMES DAILY
COMMUNITY
Start: 2025-03-02 | End: 2025-07-02

## 2025-06-27 RX ORDER — EMPAGLIFLOZIN 25 MG/1
TABLET, FILM COATED ORAL
COMMUNITY

## 2025-06-27 RX ORDER — BLOOD SUGAR DIAGNOSTIC
STRIP MISCELLANEOUS
COMMUNITY

## 2025-06-27 RX ORDER — PROMETHAZINE HYDROCHLORIDE AND DEXTROMETHORPHAN HYDROBROMIDE 6.25; 15 MG/5ML; MG/5ML
5 SYRUP ORAL 4 TIMES DAILY PRN
COMMUNITY
Start: 2025-03-25 | End: 2025-07-02

## 2025-06-27 RX ORDER — BENZONATATE 100 MG/1
200 CAPSULE ORAL 3 TIMES DAILY PRN
COMMUNITY
Start: 2025-03-25 | End: 2025-07-02

## 2025-07-01 ENCOUNTER — LAB VISIT (OUTPATIENT)
Dept: LAB | Facility: OTHER | Age: 70
End: 2025-07-01
Attending: FAMILY MEDICINE
Payer: MEDICARE

## 2025-07-01 DIAGNOSIS — C91.10 CLL (CHRONIC LYMPHOCYTIC LEUKEMIA): ICD-10-CM

## 2025-07-01 DIAGNOSIS — D72.820 LYMPHOCYTOSIS: ICD-10-CM

## 2025-07-01 LAB
ABSOLUTE EOSINOPHIL (OHS): 0.14 K/UL
ABSOLUTE MONOCYTE (OHS): 0.65 K/UL (ref 0.3–1)
ABSOLUTE NEUTROPHIL COUNT (OHS): 2.05 K/UL (ref 1.8–7.7)
ALBUMIN SERPL BCP-MCNC: 4.2 G/DL (ref 3.5–5.2)
ALP SERPL-CCNC: 100 UNIT/L (ref 40–150)
ALT SERPL W/O P-5'-P-CCNC: 13 UNIT/L (ref 10–44)
ANION GAP (OHS): 11 MMOL/L (ref 8–16)
AST SERPL-CCNC: 12 UNIT/L (ref 11–45)
BASOPHILS # BLD AUTO: 0.02 K/UL
BASOPHILS NFR BLD AUTO: 0.3 %
BILIRUB SERPL-MCNC: 0.7 MG/DL (ref 0.1–1)
BUN SERPL-MCNC: 12 MG/DL (ref 8–23)
CALCIUM SERPL-MCNC: 9.6 MG/DL (ref 8.7–10.5)
CHLORIDE SERPL-SCNC: 110 MMOL/L (ref 95–110)
CO2 SERPL-SCNC: 25 MMOL/L (ref 23–29)
CREAT SERPL-MCNC: 0.9 MG/DL (ref 0.5–1.4)
ERYTHROCYTE [DISTWIDTH] IN BLOOD BY AUTOMATED COUNT: 13 % (ref 11.5–14.5)
GFR SERPLBLD CREATININE-BSD FMLA CKD-EPI: >60 ML/MIN/1.73/M2
GLUCOSE SERPL-MCNC: 136 MG/DL (ref 70–110)
HCT VFR BLD AUTO: 46.8 % (ref 37–48.5)
HGB BLD-MCNC: 15.3 GM/DL (ref 12–16)
IMM GRANULOCYTES # BLD AUTO: 0.01 K/UL (ref 0–0.04)
IMM GRANULOCYTES NFR BLD AUTO: 0.2 % (ref 0–0.5)
LDH SERPL-CCNC: 140 U/L (ref 110–260)
LYMPHOCYTES # BLD AUTO: 3.76 K/UL (ref 1–4.8)
MCH RBC QN AUTO: 29.2 PG (ref 27–31)
MCHC RBC AUTO-ENTMCNC: 32.7 G/DL (ref 32–36)
MCV RBC AUTO: 89 FL (ref 82–98)
NUCLEATED RBC (/100WBC) (OHS): 0 /100 WBC
PLATELET # BLD AUTO: 196 K/UL (ref 150–450)
PMV BLD AUTO: 8.8 FL (ref 9.2–12.9)
POTASSIUM SERPL-SCNC: 4.5 MMOL/L (ref 3.5–5.1)
PROT SERPL-MCNC: 6.9 GM/DL (ref 6–8.4)
RBC # BLD AUTO: 5.24 M/UL (ref 4–5.4)
RELATIVE EOSINOPHIL (OHS): 2.1 %
RELATIVE LYMPHOCYTE (OHS): 56.7 % (ref 18–48)
RELATIVE MONOCYTE (OHS): 9.8 % (ref 4–15)
RELATIVE NEUTROPHIL (OHS): 30.9 % (ref 38–73)
SODIUM SERPL-SCNC: 146 MMOL/L (ref 136–145)
WBC # BLD AUTO: 6.63 K/UL (ref 3.9–12.7)

## 2025-07-01 PROCEDURE — 83615 LACTATE (LD) (LDH) ENZYME: CPT

## 2025-07-01 PROCEDURE — 84075 ASSAY ALKALINE PHOSPHATASE: CPT

## 2025-07-01 PROCEDURE — 36415 COLL VENOUS BLD VENIPUNCTURE: CPT

## 2025-07-01 PROCEDURE — 85025 COMPLETE CBC W/AUTO DIFF WBC: CPT

## 2025-07-02 ENCOUNTER — OFFICE VISIT (OUTPATIENT)
Dept: HEMATOLOGY/ONCOLOGY | Facility: CLINIC | Age: 70
End: 2025-07-02
Payer: MEDICARE

## 2025-07-02 VITALS
RESPIRATION RATE: 12 BRPM | DIASTOLIC BLOOD PRESSURE: 79 MMHG | BODY MASS INDEX: 26.23 KG/M2 | HEART RATE: 85 BPM | WEIGHT: 157.44 LBS | TEMPERATURE: 98 F | HEIGHT: 65 IN | SYSTOLIC BLOOD PRESSURE: 173 MMHG | OXYGEN SATURATION: 97 %

## 2025-07-02 DIAGNOSIS — C91.10 CLL (CHRONIC LYMPHOCYTIC LEUKEMIA): Primary | ICD-10-CM

## 2025-07-02 PROCEDURE — 1101F PT FALLS ASSESS-DOCD LE1/YR: CPT | Mod: CPTII,S$GLB,, | Performed by: INTERNAL MEDICINE

## 2025-07-02 PROCEDURE — 1126F AMNT PAIN NOTED NONE PRSNT: CPT | Mod: CPTII,S$GLB,, | Performed by: INTERNAL MEDICINE

## 2025-07-02 PROCEDURE — 3077F SYST BP >= 140 MM HG: CPT | Mod: CPTII,S$GLB,, | Performed by: INTERNAL MEDICINE

## 2025-07-02 PROCEDURE — 99213 OFFICE O/P EST LOW 20 MIN: CPT | Mod: S$GLB,,, | Performed by: INTERNAL MEDICINE

## 2025-07-02 PROCEDURE — 99999 PR PBB SHADOW E&M-EST. PATIENT-LVL III: CPT | Mod: PBBFAC,,, | Performed by: INTERNAL MEDICINE

## 2025-07-02 PROCEDURE — 1159F MED LIST DOCD IN RCRD: CPT | Mod: CPTII,S$GLB,, | Performed by: INTERNAL MEDICINE

## 2025-07-02 PROCEDURE — G2211 COMPLEX E/M VISIT ADD ON: HCPCS | Mod: S$GLB,,, | Performed by: INTERNAL MEDICINE

## 2025-07-02 PROCEDURE — 3288F FALL RISK ASSESSMENT DOCD: CPT | Mod: CPTII,S$GLB,, | Performed by: INTERNAL MEDICINE

## 2025-07-02 PROCEDURE — 1160F RVW MEDS BY RX/DR IN RCRD: CPT | Mod: CPTII,S$GLB,, | Performed by: INTERNAL MEDICINE

## 2025-07-02 PROCEDURE — 3078F DIAST BP <80 MM HG: CPT | Mod: CPTII,S$GLB,, | Performed by: INTERNAL MEDICINE

## 2025-07-02 PROCEDURE — 3008F BODY MASS INDEX DOCD: CPT | Mod: CPTII,S$GLB,, | Performed by: INTERNAL MEDICINE
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
## 2025-07-10 ENCOUNTER — TELEPHONE (OUTPATIENT)
Dept: HEMATOLOGY/ONCOLOGY | Facility: CLINIC | Age: 70
End: 2025-07-10
Payer: MEDICARE

## 2025-07-10 ENCOUNTER — PATIENT MESSAGE (OUTPATIENT)
Dept: HEMATOLOGY/ONCOLOGY | Facility: CLINIC | Age: 70
End: 2025-07-10
Payer: MEDICARE

## 2025-07-10 NOTE — TELEPHONE ENCOUNTER
I attempted to return a call to Ms. Wright x3 with no success. Her home phone number was invalid and her mobile phone went straight to voicemail x2 with no option to leave a message as VM box not set up. Will send portal message with the hope of connecting with patient regarding her stomach pain.     Copied from CRM #[id number]. Topic: General Inquiry - Patient Advice  >> Jul 10, 2025  2:00 PM Anayeli wrote:     Consult/Advisory     Name Of Caller:Geneva Wright         Contact Preference:[phone] (home)      Nature of call:Patient is calling to speak to Nini and inform her of the pains still in stomach. Requesting a call back

## 2025-07-11 ENCOUNTER — TELEPHONE (OUTPATIENT)
Dept: HEMATOLOGY/ONCOLOGY | Facility: CLINIC | Age: 70
End: 2025-07-11
Payer: MEDICARE

## 2025-07-11 DIAGNOSIS — R10.31 RIGHT LOWER QUADRANT PAIN: Primary | ICD-10-CM

## 2025-07-11 DIAGNOSIS — C91.10 CLL (CHRONIC LYMPHOCYTIC LEUKEMIA): ICD-10-CM

## 2025-07-11 NOTE — TELEPHONE ENCOUNTER
Attempted to get in touch with Ms. Immanuel Heller to see if her mother's contact information has changed as we cannot get ahold of her very well. Unable to leave  as voicemail box was full.

## 2025-07-11 NOTE — TELEPHONE ENCOUNTER
Attempted to contact patient regarding message she left.  Her voicemail has not been set up on her mobile number.  Unable to leave a message after 3 attempts.      I attempted to call her daughter who is an approved contact on her chart.  I left a voicemail requesting that she get in touch with her mother to give us a call with a valid callback number.  Callback number left.      Copied from CRM #[id number]. Topic: General Inquiry - Return Call  >> Jul 11, 2025 11:21 AM Sandy wrote:  .Type: Patient Call Back    Who called:pt    What is the request in detail: a call back in regards to message sent over for her stomach pain, just wants to speak Nini about her situation.    Can the clinic reply by THOMASS[facility]GY?    Would the patient rather a call back or a response via [facility]? call    Best call back number [phone]    Additional Information:

## 2025-07-12 ENCOUNTER — HOSPITAL ENCOUNTER (OUTPATIENT)
Dept: RADIOLOGY | Facility: OTHER | Age: 70
Discharge: HOME | End: 2025-07-12
Attending: INTERNAL MEDICINE
Payer: MEDICARE

## 2025-07-12 DIAGNOSIS — R10.31 RIGHT LOWER QUADRANT PAIN: ICD-10-CM

## 2025-07-12 PROCEDURE — 74177 CT ABD & PELVIS W/CONTRAST: CPT | Mod: TC

## 2025-07-12 PROCEDURE — 74177 CT ABD & PELVIS W/CONTRAST: CPT | Mod: 26,,, | Performed by: RADIOLOGY

## 2025-07-12 PROCEDURE — 25500020 PHARM REV CODE 255: Performed by: INTERNAL MEDICINE

## 2025-07-12 PROCEDURE — A9698 NON-RAD CONTRAST MATERIALNOC: HCPCS | Performed by: INTERNAL MEDICINE

## 2025-07-12 RX ADMIN — IOHEXOL 75 ML: 350 INJECTION, SOLUTION INTRAVENOUS at 01:07

## 2025-07-12 RX ADMIN — IOHEXOL 1000 ML: 9 SOLUTION ORAL at 01:07
